# Patient Record
Sex: FEMALE | Race: WHITE | NOT HISPANIC OR LATINO | Employment: OTHER | URBAN - METROPOLITAN AREA
[De-identification: names, ages, dates, MRNs, and addresses within clinical notes are randomized per-mention and may not be internally consistent; named-entity substitution may affect disease eponyms.]

---

## 2017-05-05 ENCOUNTER — TELEPHONE (OUTPATIENT)
Dept: OBSTETRICS AND GYNECOLOGY | Facility: OTHER | Age: 37
End: 2017-05-05

## 2017-05-05 DIAGNOSIS — M62.89 PELVIC FLOOR DYSFUNCTION: Primary | ICD-10-CM

## 2017-05-05 NOTE — TELEPHONE ENCOUNTER
Returned call to patient for PT referral for pelvic floor and advised to call clinic to advise how she would like to receive the referral

## 2017-05-15 ENCOUNTER — TELEPHONE (OUTPATIENT)
Dept: OBSTETRICS AND GYNECOLOGY | Facility: CLINIC | Age: 37
End: 2017-05-15

## 2017-05-16 NOTE — TELEPHONE ENCOUNTER
----- Message from Barby De La Garza MA sent at 5/15/2017 10:55 AM CDT -----  Regarding: Pelvic Floor PT referral  Pt has Pending review referral for postpartum pelvic floor PT for [facility]. She would like to get a referral to a location that's outside of [facility] ([facility] on [address]).    Thanks  [place]

## 2017-05-16 NOTE — TELEPHONE ENCOUNTER
Phone call to Gemma. She will call the PT she wants to go to and have them fax us an order form to complete.

## 2017-06-20 ENCOUNTER — OFFICE VISIT (OUTPATIENT)
Dept: INTERNAL MEDICINE | Facility: CLINIC | Age: 37
End: 2017-06-20
Attending: INTERNAL MEDICINE
Payer: COMMERCIAL

## 2017-06-20 VITALS
DIASTOLIC BLOOD PRESSURE: 64 MMHG | HEART RATE: 72 BPM | WEIGHT: 130.5 LBS | OXYGEN SATURATION: 96 % | SYSTOLIC BLOOD PRESSURE: 96 MMHG | HEIGHT: 69 IN | BODY MASS INDEX: 19.33 KG/M2

## 2017-06-20 DIAGNOSIS — N39.3 STRESS INCONTINENCE, FEMALE: ICD-10-CM

## 2017-06-20 DIAGNOSIS — M54.2 NECK PAIN: ICD-10-CM

## 2017-06-20 DIAGNOSIS — M25.552 CHRONIC HIP PAIN, LEFT: ICD-10-CM

## 2017-06-20 DIAGNOSIS — G89.29 CHRONIC HIP PAIN, LEFT: ICD-10-CM

## 2017-06-20 DIAGNOSIS — M54.50 CHRONIC LEFT-SIDED LOW BACK PAIN WITHOUT SCIATICA: ICD-10-CM

## 2017-06-20 DIAGNOSIS — R20.2 PARESTHESIA: ICD-10-CM

## 2017-06-20 DIAGNOSIS — R53.83 FATIGUE, UNSPECIFIED TYPE: Primary | ICD-10-CM

## 2017-06-20 DIAGNOSIS — G89.29 CHRONIC LEFT-SIDED LOW BACK PAIN WITHOUT SCIATICA: ICD-10-CM

## 2017-06-20 PROCEDURE — 99214 OFFICE O/P EST MOD 30 MIN: CPT | Mod: S$GLB,,, | Performed by: INTERNAL MEDICINE

## 2017-06-20 PROCEDURE — 99999 PR PBB SHADOW E&M-EST. PATIENT-LVL IV: CPT | Mod: PBBFAC,,, | Performed by: INTERNAL MEDICINE

## 2017-06-20 NOTE — PROGRESS NOTES
Subjective:       Patient ID: Gemma Syed is a 37 y.o. female.    Chief Complaint: Annual Exam    Here for routine exam    Chronic left low back/hip pain for several years. She has pursued acupuncture, chiropractor with short lived results. She admits she does not use these modalities for any consistent period of time. She reports a sharp, pinching pain in left gluteal region. Pain is non radiating but she does endorse a several month Hx of numbness of toes 2-4 that a few weeks ago extended to involve anterior shin. Patient denies radiation of pain,  weakness of LE, saddle anesthesia, or F/C. She has a Hx of stress incontinence that is unchanged. She was pursuing urogyn PT but has not continued exercises consistently. She also experiences left side neck pain intermittently with associated tightness. She reports chronic fatigue that she attributes to having 2 young children.                Review of Systems   Constitutional: Positive for fatigue. Negative for chills, fever and unexpected weight change.   HENT: Negative for ear pain, hearing loss, postnasal drip, tinnitus, trouble swallowing and voice change.    Respiratory: Negative for cough, chest tightness, shortness of breath and wheezing.    Cardiovascular: Negative for chest pain, palpitations and leg swelling.   Gastrointestinal: Negative for abdominal pain, blood in stool, diarrhea, nausea and vomiting.   Endocrine: Negative for polydipsia, polyphagia and polyuria.   Genitourinary: Negative for difficulty urinating, dysuria, hematuria and vaginal bleeding.   Musculoskeletal: Positive for arthralgias, back pain, neck pain and neck stiffness.   Skin: Negative for rash.   Allergic/Immunologic: Negative for food allergies.   Neurological: Positive for numbness. Negative for dizziness and headaches.   Hematological: Does not bruise/bleed easily.   Psychiatric/Behavioral: The patient is nervous/anxious.        Objective:      Vitals:    06/20/17 1333   BP:  "96/64   Pulse: 72   SpO2: 96%   Weight: 59.2 kg (130 lb 8.2 oz)   Height: 5' 9" (1.753 m)      Physical Exam   Constitutional: She is oriented to person, place, and time. She appears well-developed and well-nourished. No distress.   HENT:   Head: Normocephalic and atraumatic.   Mouth/Throat: Oropharynx is clear and moist. No oropharyngeal exudate.   Eyes: Conjunctivae and EOM are normal. Pupils are equal, round, and reactive to light. No scleral icterus.   Neck: No thyromegaly present.   Cardiovascular: Normal rate, regular rhythm and normal heart sounds.    No murmur heard.  Pulmonary/Chest: Effort normal and breath sounds normal. She has no wheezes. She has no rales.   Abdominal: Soft. She exhibits no distension. There is no tenderness.   Musculoskeletal: She exhibits no edema or tenderness.   Lymphadenopathy:     She has no cervical adenopathy.   Neurological: She is alert and oriented to person, place, and time. She has normal strength. No cranial nerve deficit or sensory deficit. She displays a negative Romberg sign. She displays no Babinski's sign on the right side. She displays no Babinski's sign on the left side.   Reflex Scores:       Bicep reflexes are 1+ on the right side and 1+ on the left side.       Patellar reflexes are 1+ on the right side and 1+ on the left side.  FTN intact  No pronator drift   Skin: Skin is warm and dry.   Psychiatric: She has a normal mood and affect. Her behavior is normal.       Assessment:       1. Fatigue, unspecified type    2. Paresthesia    3. Chronic left-sided low back pain without sciatica    4. Neck pain    5. Stress incontinence, female    6. Chronic hip pain, left        Plan:       Gemma was seen today for annual exam.    Diagnoses and all orders for this visit:    Fatigue, unspecified type  -     TSH; Future  -     T3, free; Future  -     T4, free; Future  -     Vitamin D; Future  -     Vitamin B12; Future  -     CBC auto differential; Future  -     Vitamin B1; " Future  -     C-reactive protein; Future    Paresthesia  -     Lipid panel; Future  -     RPR; Future  -     Comprehensive metabolic panel; Future    Chronic left-sided low back pain without sciatica  -     Ambulatory Referral to Physical/Occupational Therapy  -     Ambulatory Referral to [facility]  -     X-Ray Lumbar Spine Ap Lateral w/Flex Ext; Future  -     X-Ray Hip 2 View Left; Future    Neck pain  -     Ambulatory Referral to Physical/Occupational Therapy    Stress incontinence, female  -     Ambulatory Referral to Physical/Occupational Therapy    Chronic hip pain, left  -     X-Ray Hip 2 View Left; Future               Side effects of medication(s) were discussed in detail and patient voiced understanding.  Patient will call back for any issues or complications.

## 2017-06-21 ENCOUNTER — TELEPHONE (OUTPATIENT)
Dept: INTERNAL MEDICINE | Facility: CLINIC | Age: 37
End: 2017-06-21

## 2017-06-21 NOTE — PROGRESS NOTES
Subjective:      Patient ID: Gemma Syed is a 37 y.o. female.    Chief Complaint: Low-back Pain      HPI     She presents today complaining of 2 year history of constant left buttock pain with no leg pain. No significant alleviating factors. Pain is worse with prolonged standing/walking, lifting. She feels a lot of tightness in the morning. She rates her pain as a 4 on a scale of 1-10, it can get up to a 6. She feels like pain is sharp, pinching, and grabbing. No known injury. Broke her left foot and had to walk in boot prior to pain starting. She has numbness in her second and third toes on left foot that has moved up her leg into her calf. Tingling in her toes only. No weakness.     Minimal improvement with acupuncture or chiropractor in the past (30 minutes or so). She does yoga and pilates, but not routinely. She has seen relief with PT in the past. PCP ordered PT at her last visit. No injections or surgery on her back. History of chronic urinary stress incontinence.     Patient denies fevers, chills, night sweats, nausea, vomiting, and weight loss. Patient also denies bowel dysfunction, sexual dysfunction, and any saddle anesthesia. Known chronic urinary stress incontinence.       Review of Systems   Constitution: Negative for fever, weakness, malaise/fatigue, night sweats, weight gain and weight loss.   HENT: Negative for headaches, hearing loss, nosebleeds and odynophagia.    Eyes: Negative for blurred vision and double vision.   Cardiovascular: Negative for chest pain, irregular heartbeat and palpitations.   Respiratory: Negative for cough, hemoptysis, shortness of breath and wheezing.    Endocrine: Negative for cold intolerance and polydipsia.   Hematologic/Lymphatic: Does not bruise/bleed easily.   Skin: Negative for dry skin, poor wound healing, rash and suspicious lesions.   Musculoskeletal: Positive for back pain.        See HPI for pertinent positives.   Gastrointestinal: Negative for  bloating, abdominal pain, constipation, diarrhea, hematochezia, melena, nausea and vomiting.   Genitourinary: Negative for bladder incontinence, dysuria, hematuria, hesitancy and incomplete emptying.        Positive for poor bladder control.    Neurological: Negative for disturbances in coordination, dizziness, focal weakness, loss of balance, numbness, paresthesias and seizures.   Psychiatric/Behavioral: Positive for depression. Negative for hallucinations. The patient is nervous/anxious.            Objective:        General: Gemma is well-developed, well-nourished, appears stated age, in no acute distress, alert and oriented to time, place and person.     General    Vitals reviewed.  Constitutional: She is oriented to person, place, and time. She appears well-developed and well-nourished.   Pulmonary/Chest: Effort normal.   Abdominal: She exhibits no distension.   Neurological: She is alert and oriented to person, place, and time.   Psychiatric: She has a normal mood and affect. Her behavior is normal. Judgment and thought content normal.           Gait: normal, tandem walking is normal and she is able to heel/toe stand.     On exam of the lumbar spine, Inspection of back is normal, mild left buttock tenderness.     Skin in lumbar region is warm to the touch without visible rashes.     muscle tone normal without spasm, full range of motion with pain  Pain in flexion. and Pain in extension.    Strength testing of the bilateral LEs shows  Right hip abduction:  +5/5  Left hip abduction:  +5/5  Right hip flexion:  +5/5   Left hip flexion:  +5/5  Right hip extensors:  +5/5  Left hip extensors:  +5/5  Right quadriceps:  +5/5  Left quadriceps:  +5/5  Right hamstring:  +5/5  Left hamstring:  +5/5  Right dorsiflexion:  +5/5  Left dorsiflexion:  +5/5  Right plantar flexion:  +5/5  Left plantar flexion:  +5/5   Right EHL:  +5/5   Left EHL:  +5/5    negative clonus of bilateral LEs.     negative straight leg raise on  bilateral LEs.     DTRs:  Right patellar:  +2     Left patellar:  +2  Right achilles:  +2   Left achilles:  +2    Sensation is grossly intact in L2, L3, L4, L5, and S1 distribution.    Right hip has no pain with IR/ER. Left hip has no pain with IR/ER.      On exam of bilateral UEs, patient has full painfree ROM with no signs of clubbing, laxity, cyanosis, edema, instability, weakness, or tenderness.       XRAY INTERPRETATION:  X-rays of lumbar spine (AP/lat/flex/ext) dated 6/23/17 are personally reviewed and show slip L4-L5 with mild DDD and facet hypertrophy L4-S1.        Assessment:       1. Spondylosis without myelopathy    2. DDD (degenerative disc disease), lumbosacral    3. Acquired spondylolisthesis    4. Chronic left-sided low back pain without sciatica           Plan:       Orders Placed This Encounter    Ambulatory referral to Physical Therapy - Lumbar       2 year history of constant left buttock pain with no leg pain. Known slip L4-L5 with mild DDD and facet hypertrophy L4-S1. Pain likely multifactorial, but mostly facet mediated. Treatment options reviewed with patient along with above lumbar XRs. Following plan made:     - Agree with starting [facility]. It is more convenient for her than [facility]. External orders given.   - Discussed further treatment including NSAIDs, dose pack, and injections if pain gets worse.   - Discussed importance of maintaining healthy weight, staying active, and not smoking (she is not a smoker).   - She will f/u prn at her request.     Follow-up: Return if symptoms worsen or fail to improve. If there are any questions prior to this, the patient was instructed to contact the office.

## 2017-06-21 NOTE — TELEPHONE ENCOUNTER
----- Message from Mi Stearns sent at 6/21/2017  3:49 PM CDT -----  Contact: self  Pt is calling to discuss her labs.  She can be reached at [phone]

## 2017-06-22 ENCOUNTER — PATIENT MESSAGE (OUTPATIENT)
Dept: INTERNAL MEDICINE | Facility: CLINIC | Age: 37
End: 2017-06-22

## 2017-06-23 ENCOUNTER — HOSPITAL ENCOUNTER (OUTPATIENT)
Dept: RADIOLOGY | Facility: OTHER | Age: 37
Discharge: HOME | End: 2017-06-23
Attending: INTERNAL MEDICINE
Payer: COMMERCIAL

## 2017-06-23 ENCOUNTER — OFFICE VISIT (OUTPATIENT)
Dept: SPINE | Facility: CLINIC | Age: 37
End: 2017-06-23
Attending: INTERNAL MEDICINE
Payer: COMMERCIAL

## 2017-06-23 ENCOUNTER — TELEPHONE (OUTPATIENT)
Dept: INTERNAL MEDICINE | Facility: CLINIC | Age: 37
End: 2017-06-23

## 2017-06-23 VITALS
SYSTOLIC BLOOD PRESSURE: 102 MMHG | WEIGHT: 130 LBS | HEIGHT: 69 IN | HEART RATE: 62 BPM | DIASTOLIC BLOOD PRESSURE: 55 MMHG | BODY MASS INDEX: 19.26 KG/M2

## 2017-06-23 DIAGNOSIS — M47.819 SPONDYLOSIS WITHOUT MYELOPATHY: ICD-10-CM

## 2017-06-23 DIAGNOSIS — G89.29 CHRONIC HIP PAIN, LEFT: ICD-10-CM

## 2017-06-23 DIAGNOSIS — M43.10 ACQUIRED SPONDYLOLISTHESIS: ICD-10-CM

## 2017-06-23 DIAGNOSIS — M54.50 CHRONIC LEFT-SIDED LOW BACK PAIN WITHOUT SCIATICA: ICD-10-CM

## 2017-06-23 DIAGNOSIS — M51.37 DDD (DEGENERATIVE DISC DISEASE), LUMBOSACRAL: ICD-10-CM

## 2017-06-23 DIAGNOSIS — G89.29 CHRONIC LEFT-SIDED LOW BACK PAIN WITHOUT SCIATICA: ICD-10-CM

## 2017-06-23 DIAGNOSIS — M25.552 CHRONIC HIP PAIN, LEFT: ICD-10-CM

## 2017-06-23 PROCEDURE — 72120 X-RAY BEND ONLY L-S SPINE: CPT | Mod: 26,,, | Performed by: RADIOLOGY

## 2017-06-23 PROCEDURE — 99999 PR PBB SHADOW E&M-EST. PATIENT-LVL III: CPT | Mod: PBBFAC,,, | Performed by: PHYSICIAN ASSISTANT

## 2017-06-23 PROCEDURE — 72120 X-RAY BEND ONLY L-S SPINE: CPT | Mod: TC

## 2017-06-23 PROCEDURE — 73502 X-RAY EXAM HIP UNI 2-3 VIEWS: CPT | Mod: 26,LT,, | Performed by: RADIOLOGY

## 2017-06-23 PROCEDURE — 72100 X-RAY EXAM L-S SPINE 2/3 VWS: CPT | Mod: 26,,, | Performed by: RADIOLOGY

## 2017-06-23 PROCEDURE — 73502 X-RAY EXAM HIP UNI 2-3 VIEWS: CPT | Mod: TC,LT

## 2017-06-23 PROCEDURE — 99203 OFFICE O/P NEW LOW 30 MIN: CPT | Mod: S$GLB,,, | Performed by: PHYSICIAN ASSISTANT

## 2017-06-23 NOTE — LETTER
June 23, 2017      Sergio Mandujano MD  [address]  [address]  [facility] [address]           [facility]  [address], [address]  [facility] [address]  Phone: [phone]  Fax: [phone]          Patient: Gemma Syed   MR Number: [id number]   YOB: 1980   Date of Visit: 6/23/2017       Dear Dr. Sergio Mandujano:    Thank you for referring Gemma Syed to me for evaluation. Attached you will find relevant portions of my assessment and plan of care.    If you have questions, please do not hesitate to call me. I look forward to following Gemma Syed along with you.    Sincerely,    BOBBI Craneosure  CC:  No Recipients    If you would like to receive this communication electronically, please contact [email or web address] or [phone] to request more information on [employer] Link access.    For providers and/or their staff who would like to refer a patient to [facility], please contact us through our one-stop-shop provider referral line, [facility], at [phone].    If you feel you have received this communication in error or would no longer like to receive these types of communications, please e-mail [email or web address]

## 2017-06-23 NOTE — TELEPHONE ENCOUNTER
----- Message from Myla Stein sent at 6/23/2017  8:26 AM CDT -----  _  1st Request  _  2nd Request  _  3rd Request        Who: pt    Why: pt is calling to get some CPT codes for the labs she is having done    What Number to Call Back: [phone]    When to Expect a call back: (Before the end of the day)   -- if the call is after 12:00, the call back will be tomorrow.

## 2017-06-23 NOTE — TELEPHONE ENCOUNTER
I have sent message to pt to see which labs she needs CPT codes for. The CPT code is the ICD 10 number that is under diagnosis attached to each lab order. Can give to patient if she calls back.

## 2017-07-05 LAB
1,25(OH)2D SERPL-MCNC: 47 PG/ML (ref 18–72)
1,25(OH)2D2 SERPL-MCNC: <8 PG/ML
1,25(OH)2D3 SERPL-MCNC: 47 PG/ML
ALBUMIN SERPL-MCNC: 4.6 G/DL (ref 3.6–5.1)
ALBUMIN/GLOB SERPL: 1.9 (CALC) (ref 1–2.5)
ALP SERPL-CCNC: 65 U/L (ref 33–115)
ALT SERPL-CCNC: 14 U/L (ref 6–29)
AST SERPL-CCNC: 17 U/L (ref 10–30)
BASOPHILS # BLD AUTO: 17 CELLS/UL (ref 0–200)
BASOPHILS NFR BLD AUTO: 0.3 %
BILIRUB SERPL-MCNC: 0.6 MG/DL (ref 0.2–1.2)
BUN SERPL-MCNC: 13 MG/DL (ref 7–25)
BUN/CREAT SERPL: NORMAL (CALC) (ref 6–22)
CALCIUM SERPL-MCNC: 9.2 MG/DL (ref 8.6–10.2)
CHLORIDE SERPL-SCNC: 104 MMOL/L (ref 98–110)
CHOLEST SERPL-MCNC: 219 MG/DL (ref 125–200)
CHOLEST/HDLC SERPL: 3.3 (CALC)
CO2 SERPL-SCNC: 26 MMOL/L (ref 20–31)
CREAT SERPL-MCNC: 0.71 MG/DL (ref 0.5–1.1)
CRP SERPL-MCNC: <0.1 MG/DL
EOSINOPHIL # BLD AUTO: 67 CELLS/UL (ref 15–500)
EOSINOPHIL NFR BLD AUTO: 1.2 %
ERYTHROCYTE [DISTWIDTH] IN BLOOD BY AUTOMATED COUNT: 13.2 % (ref 11–15)
GFR SERPL CREATININE-BSD FRML MDRD: 109 ML/MIN/1.73M2
GLOBULIN SER CALC-MCNC: 2.4 G/DL (CALC) (ref 1.9–3.7)
GLUCOSE SERPL-MCNC: 90 MG/DL (ref 65–99)
HCT VFR BLD AUTO: 42.4 % (ref 35–45)
HDLC SERPL-MCNC: 67 MG/DL
HGB BLD-MCNC: 13.9 G/DL (ref 11.7–15.5)
LDLC SERPL CALC-MCNC: 141 MG/DL (CALC)
LYMPHOCYTES # BLD AUTO: 1702 CELLS/UL (ref 850–3900)
LYMPHOCYTES NFR BLD AUTO: 30.4 %
MCH RBC QN AUTO: 30.7 PG (ref 27–33)
MCHC RBC AUTO-ENTMCNC: 32.7 G/DL (ref 32–36)
MCV RBC AUTO: 93.7 FL (ref 80–100)
MONOCYTES # BLD AUTO: 459 CELLS/UL (ref 200–950)
MONOCYTES NFR BLD AUTO: 8.2 %
NEUTROPHILS # BLD AUTO: 3354 CELLS/UL (ref 1500–7800)
NEUTROPHILS NFR BLD AUTO: 59.9 %
NONHDLC SERPL-MCNC: 152 MG/DL (CALC)
PLATELET # BLD AUTO: 223 THOUSAND/UL (ref 140–400)
PMV BLD REES-ECKER: 8.7 FL (ref 7.5–12.5)
POTASSIUM SERPL-SCNC: 5.3 MMOL/L (ref 3.5–5.3)
PROT SERPL-MCNC: 7 G/DL (ref 6.1–8.1)
RBC # BLD AUTO: 4.53 MILLION/UL (ref 3.8–5.1)
RPR SER QL: NORMAL
SODIUM SERPL-SCNC: 139 MMOL/L (ref 135–146)
T3FREE SERPL-MCNC: 2.9 PG/ML (ref 2.3–4.2)
T4 FREE SERPL-MCNC: 1.1 NG/DL (ref 0.8–1.8)
TRIGL SERPL-MCNC: 55 MG/DL
TSH SERPL-ACNC: 3.54 MIU/L
VIT B1 BLD-SCNC: 145 NMOL/L (ref 78–185)
VIT B12 SERPL-MCNC: 485 PG/ML (ref 200–1100)
WBC # BLD AUTO: 5.6 THOUSAND/UL (ref 3.8–10.8)

## 2017-08-15 ENCOUNTER — TELEPHONE (OUTPATIENT)
Dept: INTERNAL MEDICINE | Facility: CLINIC | Age: 37
End: 2017-08-15

## 2017-08-15 NOTE — TELEPHONE ENCOUNTER
----- Message from Bere Arechiga sent at 8/10/2017  9:39 AM CDT -----  Contact: [facility] [phone]  _  1st Request  _  2nd Request  _  3rd Request      [facility] [phone]  Who:     Why: toma faxed an updated plan of care on 7/11/17. Please fax back [phone]    What Number to Call Back:[facility] [phone]    When to Expect a call back: (With in 24 hours)